# Patient Record
Sex: FEMALE | Race: BLACK OR AFRICAN AMERICAN | Employment: OTHER | URBAN - METROPOLITAN AREA
[De-identification: names, ages, dates, MRNs, and addresses within clinical notes are randomized per-mention and may not be internally consistent; named-entity substitution may affect disease eponyms.]

---

## 2024-10-22 ENCOUNTER — APPOINTMENT (OUTPATIENT)
Dept: PHYSICAL THERAPY | Age: 44
End: 2024-10-22
Payer: MEDICAID

## 2024-10-29 ENCOUNTER — HOSPITAL ENCOUNTER (OUTPATIENT)
Dept: PHYSICAL THERAPY | Age: 44
Setting detail: RECURRING SERIES
Discharge: HOME | End: 2024-11-01
Payer: MEDICAID

## 2024-10-29 DIAGNOSIS — I89.0 LYMPHEDEMA, NOT ELSEWHERE CLASSIFIED: Primary | ICD-10-CM

## 2024-10-29 DIAGNOSIS — R26.2 DIFFICULTY IN WALKING, NOT ELSEWHERE CLASSIFIED: ICD-10-CM

## 2024-10-29 DIAGNOSIS — M79.661 PAIN IN BOTH LOWER LEGS: ICD-10-CM

## 2024-10-29 DIAGNOSIS — M79.662 PAIN IN BOTH LOWER LEGS: ICD-10-CM

## 2024-10-29 PROCEDURE — 97166 OT EVAL MOD COMPLEX 45 MIN: CPT

## 2024-10-29 ASSESSMENT — PAIN SCALES - GENERAL: PAINLEVEL_OUTOF10: 9

## 2024-10-29 NOTE — THERAPY EVALUATION
Sammie Kendrick  : 1980  [place]  Secondary:  [facility] @ [address] [place] [address]  Phone: [phone]  Fax: [phone] Plan Frequency: 2x/week    Certification Period Expiration Date: 25        Plan of Care/Certification Expiration Date:  Certification Period Expiration Date: 25      Frequency/Duration: Plan Frequency: 2x/week      Time In/Out:   Time In: 1015  Time Out: 1055    OT Visit Info:  Plan Frequency: 2x/week       Visit Count: Visit count could not be calculated. Make sure you are using a visit which is associated with an episode.   OUTPATIENT OCCUPATIONAL THERAPY:Initial Assessment 10/29/2024  Episode: (lymphedema)           Treatment Diagnosis:    Lymphedema, not elsewhere classified  Difficulty in walking, not elsewhere classified  Pain in both lower legs  Medical/Referring Diagnosis:    Lymphedema, not elsewhere classified   Referring Physician:  Arnel Scott Jr., MD MD Orders:  OT Eval and Treat   Return MD Appt:  TBD  Date of Onset:    lifelong with symptoms worsening after blood clot in   Allergies:  Patient has no allergy information on record.  Restrictions/Precautions:    None      Medications Last Reviewed:  10/29/2024     SUBJECTIVE   History of Injury/Illness (Reason for Referral):  Patient referred to OT for evaluation and treatment of lymphedema. Swelling affects ability to stand, walk, transfer, perform ADLs. Skilled OT recommended for complete decongestive therapy to reduce swelling before transitioning to garments and home program.    Patient Stated Goal(s):  \"stand and walk for longer periods of time\"  Initial Pain Assessment:     9/10    Post Session Pain Level:     10  Past Medical History/Comorbidities:   Ms. Kendrick  has no past medical history on file.  Ms. Kendrick  has no past surgical history on file. Per chart: HTN, anxiety, lymphedema, morbid obesity, history

## 2024-10-29 NOTE — PROGRESS NOTES
Sammie Kendrick  : 1980  [place]  Secondary:  [facility] [place] [facility]  [address] [facility] [place] [address]  Phone: [phone]  Fax: [phone]    Plan of Care/Certification Expiration Date:  Certification Period Expiration Date: 25      Frequency/Duration:   Plan Frequency: 2x/week      Time In/Out:   Time In: 1015  Time Out: 1055    OT Visit Info:  Plan Frequency: 2x/week       Visit Count: Visit count could not be calculated. Make sure you are using a visit which is associated with an episode.   OUTPATIENT OCCUPATIONAL THERAPY: Treatment Note 10/29/2024  Episode: (lymphedema)         Treatment Diagnosis:    Lymphedema, not elsewhere classified  Difficulty in walking, not elsewhere classified  Pain in both lower legs  Medical/Referring Diagnosis:   Lymphedema, not elsewhere classified   Referring Physician:  Arnel Scott Jr., MD MD Orders:  OT Eval and Treat   Return MD Appt:  TBD   Date of Onset:     lifelong with symptoms worsening after blood clot in    Allergies:  Patient has no allergy information on record.  Restrictions/Precautions:   None      Medications Last Reviewed:  10/29/2024     Interventions Planned (Treatment may consist of any combination of the following):     Manual therapy: manual lymph drainage, Self care/ADL: multi layer compression bandaging, compression garment fitting, skin care, home program development, patient/caregiver training/education, ADL/home management training, Therapeutic exercises: ROM, strengthening     Subjective Comments:   \"I want to get rid of this swelling so I can be more active\"   >Initial Pain Level:     9/10  >Post Session Pain Level:     9/10  Medications Last Reviewed:  10/29/2024  Updated Objective Findings:  See Evaluation Note from today  Treatment   THERAPEUTIC EXERCISE: ( minutes):      MANUAL THERAPY: ( minutes):   Manual lymphatic drainage was utilized and necessary

## 2024-12-03 ENCOUNTER — HOSPITAL ENCOUNTER (OUTPATIENT)
Dept: PHYSICAL THERAPY | Age: 44
Setting detail: RECURRING SERIES
Discharge: HOME | End: 2024-12-06
Payer: MEDICAID

## 2024-12-03 PROCEDURE — 97140 MANUAL THERAPY 1/> REGIONS: CPT

## 2024-12-03 PROCEDURE — 97535 SELF CARE MNGMENT TRAINING: CPT

## 2024-12-03 ASSESSMENT — PAIN SCALES - GENERAL: PAINLEVEL_OUTOF10: 9

## 2024-12-03 NOTE — PROGRESS NOTES
Sammie Kendrick  : 1980  [place]  Secondary:  [facility] @ [facility]  [address] [facility] [place] [address]  Phone: [phone]  Fax: [phone]    Plan of Care/Certification Expiration Date:  Certification Period Expiration Date: 25      Frequency/Duration:   Plan Frequency: 2x/week      Time In/Out:   Time In: 0900  Time Out: 1000    OT Visit Info:  Plan Frequency: 2x/week       Visit Count: Visit count could not be calculated. Make sure you are using a visit which is associated with an episode.   OUTPATIENT OCCUPATIONAL THERAPY: Treatment Note 12/3/2024  Episode: (lymphedema)         Treatment Diagnosis:    No data found  Medical/Referring Diagnosis:      Referring Physician:  Arnel Scott Jr., MD MD Orders:  OT Eval and Treat   Return MD Appt:  TBD   Date of Onset:     lifelong with symptoms worsening after blood clot in    Allergies:  Patient has no allergy information on record.  Restrictions/Precautions:   None      Medications Last Reviewed:  12/3/2024     Interventions Planned (Treatment may consist of any combination of the following):     Manual therapy: manual lymph drainage, Self care/ADL: multi layer compression bandaging, compression garment fitting, skin care, home program development, patient/caregiver training/education, ADL/home management training, Therapeutic exercises: ROM, strengthening     Subjective Comments:   No new complaints  >Initial Pain Level:     9/10  >Post Session Pain Level:     8/10  Medications Last Reviewed:  12/3/2024  Updated Objective Findings:  None Today  Treatment   THERAPEUTIC EXERCISE: ( minutes):      MANUAL THERAPY: ( 35minutes):   Manual lymphatic drainage was utilized and necessary because of the patient's  lymphedema .   Manual Lymph Drainage:   Lymph Nodes:    Cervical Supraclavicular Axillary Abdominal Inguinal Popliteal Antecubital   RIGHT []     []     []     []     [x]      Detail Level: Detailed Detail Level: Zone

## 2024-12-05 ENCOUNTER — HOSPITAL ENCOUNTER (OUTPATIENT)
Dept: PHYSICAL THERAPY | Age: 44
Setting detail: RECURRING SERIES
Discharge: HOME | End: 2024-12-08
Payer: MEDICAID

## 2024-12-05 PROCEDURE — 97140 MANUAL THERAPY 1/> REGIONS: CPT

## 2024-12-05 PROCEDURE — 97535 SELF CARE MNGMENT TRAINING: CPT

## 2024-12-05 NOTE — PROGRESS NOTES
Sammie Kendrick  : 1980  [place]  Secondary:  [facility] @ [facility]  [address] [facility] [place] [address]  Phone: [phone]  Fax: [phone]    Plan of Care/Certification Expiration Date:  Certification Period Expiration Date: 25      Frequency/Duration:   Plan Frequency: 2x/week      Time In/Out:   Time In: 0900  Time Out: 1000    OT Visit Info:  Plan Frequency: 2x/week       Visit Count: Visit count could not be calculated. Make sure you are using a visit which is associated with an episode.   OUTPATIENT OCCUPATIONAL THERAPY: Treatment Note 2024  Episode: (lymphedema)         Treatment Diagnosis:    No data found  Medical/Referring Diagnosis:      Referring Physician:  Arnel Scott Jr., MD MD Orders:  OT Eval and Treat   Return MD Appt:  TBD   Date of Onset:     lifelong with symptoms worsening after blood clot in    Allergies:  Patient has no allergy information on record.  Restrictions/Precautions:   None      Medications Last Reviewed:  2024     Interventions Planned (Treatment may consist of any combination of the following):     Manual therapy: manual lymph drainage, Self care/ADL: multi layer compression bandaging, compression garment fitting, skin care, home program development, patient/caregiver training/education, ADL/home management training, Therapeutic exercises: ROM, strengthening     Subjective Comments:   No new complaints  >Initial Pain Level:      /10  >Post Session Pain Level:      /10  Medications Last Reviewed:  2024  Updated Objective Findings:  None Today  Treatment   THERAPEUTIC EXERCISE: ( minutes):      MANUAL THERAPY: ( 35minutes):   Manual lymphatic drainage was utilized and necessary because of the patient's  lymphedema .   Manual Lymph Drainage:   Lymph Nodes:    Cervical Supraclavicular Axillary Abdominal Inguinal Popliteal Antecubital   RIGHT []     []     []     []     [x]

## 2024-12-09 ENCOUNTER — APPOINTMENT (OUTPATIENT)
Dept: PHYSICAL THERAPY | Age: 44
End: 2024-12-09
Payer: MEDICAID

## 2024-12-11 ENCOUNTER — HOSPITAL ENCOUNTER (OUTPATIENT)
Dept: PHYSICAL THERAPY | Age: 44
Setting detail: RECURRING SERIES
Discharge: HOME | End: 2024-12-14
Payer: MEDICAID

## 2024-12-11 PROCEDURE — 97535 SELF CARE MNGMENT TRAINING: CPT

## 2024-12-11 PROCEDURE — 97140 MANUAL THERAPY 1/> REGIONS: CPT

## 2024-12-11 NOTE — PROGRESS NOTES
Sammie Kendrick  : 1980  [place]  Secondary:  [facility] @ [facility]  [address] [facility] [place] [address]  Phone: [phone]  Fax: [phone]    Plan of Care/Certification Expiration Date:  Certification Period Expiration Date: 25      Frequency/Duration:   Plan Frequency: 2x/week      Time In/Out:        OT Visit Info:  Plan Frequency: 2x/week       Visit Count: Visit count could not be calculated. Make sure you are using a visit which is associated with an episode.   OUTPATIENT OCCUPATIONAL THERAPY: Treatment Note 2024  Episode: (lymphedema)         Treatment Diagnosis:    No data found  Medical/Referring Diagnosis:   Lymphedema, not elsewhere classified   Referring Physician:  Arnel Scott Jr., MD MD Orders:  OT Eval and Treat   Return MD Appt:  TBD   Date of Onset:     lifelong with symptoms worsening after blood clot in    Allergies:  Patient has no allergy information on record.  Restrictions/Precautions:   None      Medications Last Reviewed:  2024     Interventions Planned (Treatment may consist of any combination of the following):     Manual therapy: manual lymph drainage, Self care/ADL: multi layer compression bandaging, compression garment fitting, skin care, home program development, patient/caregiver training/education, ADL/home management training, Therapeutic exercises: ROM, strengthening     Subjective Comments:   No new complaints  >Initial Pain Level:      /10  >Post Session Pain Level:      /10  Medications Last Reviewed:  2024  Updated Objective Findings:  None Today  Treatment   THERAPEUTIC EXERCISE: ( minutes):      MANUAL THERAPY: ( 35minutes):   Manual lymphatic drainage was utilized and necessary because of the patient's  lymphedema .   Manual Lymph Drainage:   Lymph Nodes:    Cervical Supraclavicular Axillary Abdominal Inguinal Popliteal Antecubital   RIGHT []     []     []     []

## 2024-12-16 ENCOUNTER — HOSPITAL ENCOUNTER (OUTPATIENT)
Dept: PHYSICAL THERAPY | Age: 44
Setting detail: RECURRING SERIES
Discharge: HOME | End: 2024-12-19
Payer: MEDICAID

## 2024-12-16 PROCEDURE — 97535 SELF CARE MNGMENT TRAINING: CPT

## 2024-12-16 PROCEDURE — 97140 MANUAL THERAPY 1/> REGIONS: CPT

## 2024-12-16 ASSESSMENT — PAIN SCALES - GENERAL: PAINLEVEL_OUTOF10: 6

## 2024-12-16 NOTE — PROGRESS NOTES
Sammie Kendrick  : 1980  [place]  Secondary:  [facility] @ [address] [facility] [place] [address]  Phone: [phone]  Fax: [phone]    Plan of Care/Certification Expiration Date:  Certification Period Expiration Date: 25      Frequency/Duration:   Plan Frequency: 2x/week      Time In/Out:   Time In: 1230  Time Out: 1330    OT Visit Info:  Plan Frequency: 2x/week        OUTPATIENT OCCUPATIONAL THERAPY: Treatment Note 2024  Episode: (lymphedema)         Treatment Diagnosis:    No data found  Medical/Referring Diagnosis:   Lymphedema, not elsewhere classified   Referring Physician:  Arnel Scott Jr., MD MD Orders:  OT Eval and Treat   Return MD Appt:  TBD   Date of Onset:     lifelong with symptoms worsening after blood clot in    Allergies:  Patient has no allergy information on record.  Restrictions/Precautions:   None      Medications Last Reviewed:  2024     Interventions Planned (Treatment may consist of any combination of the following):     Manual therapy: manual lymph drainage, Self care/ADL: multi layer compression bandaging, compression garment fitting, skin care, home program development, patient/caregiver training/education, ADL/home management training, Therapeutic exercises: ROM, strengthening     Subjective Comments:   \"The bandages feel good on\"  >Initial Pain Level:     6/10  >Post Session Pain Level:     6/10  Medications Last Reviewed:  2024  Updated Objective Findings:  None Today  Treatment   THERAPEUTIC EXERCISE: ( minutes):      MANUAL THERAPY: ( 40minutes):   Manual lymphatic drainage was utilized and necessary because of the patient's  lymphedema .   Manual Lymph Drainage:   Lymph Nodes:    Cervical Supraclavicular Axillary Abdominal Inguinal Popliteal Antecubital   RIGHT []     [x]     []     []     [x]     [x]     []       LEFT []     [x]     []     []     [x]     [x]     []

## 2024-12-18 ENCOUNTER — APPOINTMENT (OUTPATIENT)
Dept: PHYSICAL THERAPY | Age: 44
End: 2024-12-18
Payer: MEDICAID

## 2024-12-26 ENCOUNTER — HOSPITAL ENCOUNTER (OUTPATIENT)
Dept: PHYSICAL THERAPY | Age: 44
Setting detail: RECURRING SERIES
Discharge: HOME | End: 2024-12-29
Payer: MEDICAID

## 2024-12-26 DIAGNOSIS — I89.0 LYMPHEDEMA: Primary | ICD-10-CM

## 2024-12-26 PROCEDURE — 97535 SELF CARE MNGMENT TRAINING: CPT

## 2024-12-26 PROCEDURE — 97140 MANUAL THERAPY 1/> REGIONS: CPT

## 2024-12-26 NOTE — PROGRESS NOTES
Sammie Kendrick  : 1980  [place]  Secondary:  [facility] @ [place] [address] [address] [facility] [place] [address]  Phone: [phone]  Fax: [phone]    Plan of Care/Certification Expiration Date:  Certification Period Expiration Date: 25      Frequency/Duration:   Plan Frequency: 2x/week      Time In/Out:   Time In: 1430  Time Out: 1530    OT Visit Info:  Plan Frequency: 2x/week        OUTPATIENT OCCUPATIONAL THERAPY: Treatment Note 2024  Episode: (lymphedema)         Treatment Diagnosis:    Lymphedema  Medical/Referring Diagnosis:   Lymphedema, not elsewhere classified   Referring Physician:  Arnel Scott Jr., MD MD Orders:  OT Eval and Treat   Return MD Appt:  TBD   Date of Onset:     lifelong with symptoms worsening after blood clot in    Allergies:  Patient has no allergy information on record.  Restrictions/Precautions:   None      Medications Last Reviewed:  2024     Interventions Planned (Treatment may consist of any combination of the following):     Manual therapy: manual lymph drainage, Self care/ADL: multi layer compression bandaging, compression garment fitting, skin care, home program development, patient/caregiver training/education, ADL/home management training, Therapeutic exercises: ROM, strengthening     Subjective Comments:   \"The bandages feel good on\"  >Initial Pain Level:      /10  >Post Session Pain Level:      /10  Medications Last Reviewed:  2024  Updated Objective Findings:  None Today  Treatment   THERAPEUTIC EXERCISE: ( minutes):      MANUAL THERAPY: ( 40minutes):   Manual lymphatic drainage was utilized and necessary because of the patient's  lymphedema .   Manual Lymph Drainage:   Lymph Nodes:    Cervical Supraclavicular Axillary Abdominal Inguinal Popliteal Antecubital   RIGHT []     [x]     []     []     [x]     [x]     []       LEFT []     [x]     []     []     [x]     [x]     []

## 2024-12-30 ENCOUNTER — HOSPITAL ENCOUNTER (OUTPATIENT)
Dept: PHYSICAL THERAPY | Age: 44
Setting detail: RECURRING SERIES
Discharge: HOME | End: 2025-01-02
Payer: MEDICAID

## 2024-12-30 PROCEDURE — 97535 SELF CARE MNGMENT TRAINING: CPT

## 2024-12-30 PROCEDURE — 97140 MANUAL THERAPY 1/> REGIONS: CPT

## 2024-12-30 ASSESSMENT — PAIN SCALES - GENERAL: PAINLEVEL_OUTOF10: 6

## 2024-12-30 NOTE — PROGRESS NOTES
LEFT []     [x]     []     []     [x]     [x]     []           Limbs:   []    RUE     []    LUE     [x]    RLE    [x]    LLE   SELF CARE: (40 minutes):   After skin care with Eucerin lotion, a multi layered compression bandage was applied bilaterally from foot to knee using cotton stockinet, rosidal foam and 3 short stretch bandages per leg. She was instructed on exercises, elevation and to remove bandages if any medical complications.   Measured for Compriflex Sigvaris Transition Calf pieces:  R size large; L size XL Regular length black  Recommend patient purchase Bioflect leggings  Measured cumulative circumferential volumetric graph measurements today.      PRETREATMENT AFFECTED LIMB(s): RIGHT LOWER EXTREMITY  LEFT LOWER EXTREMITY       Date:   10-29-24 12.30          Right / Left              Groin    []      []              8 inches    []      [] 86/89  74/85          4 inches    []      [] 72/80.5  62/63       PoplitealSpace    []      [] 50.5/52  48/49          8 inches    []      [] 47.5/  52.5  47/51          4 inches    []      [] 38/39  38/41          Ankle    []      [] 32.5/33  29/31          Instep    []      [] 25/26.5  26/26       Measurements are taken in centimeters:  2.54 cm = 1 inch   Cumulative circumferential volumetric graph measurements  RLE total size 351.5  cm         LLE total size 372.5  cm                 Treatment/Session Summary:    Treatment Assessment:   Patient is making progress. Recommend continuation of therapy  Authorization sent on 12/27/2024 for more visits.   Communication/Consultation:  Prism order placed  Equipment provided today:  None  Recommendations/Intent for next treatment session: Next visit will focus on complete decongestive therapy to reduce lymphedema (phase I) before transitioning maintenance/home program (phase II).    >Total Treatment Billable Duration:   OT Individual Minutes  Time In: 1335  Time Out: 1435  Minutes: 60    Ariana Horton OTR/ASUNCION,

## 2025-01-06 ENCOUNTER — HOSPITAL ENCOUNTER (OUTPATIENT)
Dept: PHYSICAL THERAPY | Age: 45
Setting detail: RECURRING SERIES
End: 2025-01-06
Payer: MEDICAID

## 2025-01-08 ENCOUNTER — APPOINTMENT (OUTPATIENT)
Dept: PHYSICAL THERAPY | Age: 45
End: 2025-01-08
Payer: MEDICAID

## 2025-01-15 ENCOUNTER — HOSPITAL ENCOUNTER (OUTPATIENT)
Dept: PHYSICAL THERAPY | Age: 45
Setting detail: RECURRING SERIES
Discharge: HOME | End: 2025-01-18
Payer: MEDICAID

## 2025-01-15 PROCEDURE — 97535 SELF CARE MNGMENT TRAINING: CPT

## 2025-01-15 PROCEDURE — 97140 MANUAL THERAPY 1/> REGIONS: CPT

## 2025-01-15 NOTE — PROGRESS NOTES
Sammie Kendrick  : 1980  [place]  Secondary:  [facility] @ [facility]  [address] [facility] [place] [address]  Phone: [phone]  Fax: [phone]    Plan of Care/Certification Expiration Date:  Certification Period Expiration Date: 25      Frequency/Duration:   Plan Frequency: 2x/week      Time In/Out:        OT Visit Info:  Plan Frequency: 2x/week  Total # of Visits to Date: 7  Progress Note Counter: 7        OUTPATIENT OCCUPATIONAL THERAPY: Treatment Note 1/15/2025  Episode: (lymphedema)         Treatment Diagnosis:    No data found  Medical/Referring Diagnosis:   Lymphedema, not elsewhere classified   Referring Physician:  Arnel Scott Jr., MD MD Orders:  OT Eval and Treat   Return MD Appt:  TBD   Date of Onset:     lifelong with symptoms worsening after blood clot in    Allergies:  Patient has no allergy information on record.  Restrictions/Precautions:   None      Medications Last Reviewed:  1/15/2025     Interventions Planned (Treatment may consist of any combination of the following):     Manual therapy: manual lymph drainage, Self care/ADL: multi layer compression bandaging, compression garment fitting, skin care, home program development, patient/caregiver training/education, ADL/home management training, Therapeutic exercises: ROM, strengthening     Subjective Comments:   \"The bandages feel good on\"  >Initial Pain Level:      /10  >Post Session Pain Level:      /10  Medications Last Reviewed:  1/15/2025  Updated Objective Findings:  None Today  Treatment   THERAPEUTIC EXERCISE: ( minutes):      MANUAL THERAPY: ( 20 minutes):   Manual lymphatic drainage was utilized and necessary because of the patient's  lymphedema .   Manual Lymph Drainage:   Lymph Nodes:    Cervical Supraclavicular Axillary Abdominal Inguinal Popliteal Antecubital   RIGHT []     [x]     []     []     [x]     [x]     []       LEFT []     [x]     []

## 2025-01-21 ENCOUNTER — HOSPITAL ENCOUNTER (OUTPATIENT)
Dept: PHYSICAL THERAPY | Age: 45
Setting detail: RECURRING SERIES
Discharge: HOME | End: 2025-01-24
Payer: MEDICAID

## 2025-01-21 PROCEDURE — 97140 MANUAL THERAPY 1/> REGIONS: CPT

## 2025-01-21 PROCEDURE — 97535 SELF CARE MNGMENT TRAINING: CPT

## 2025-01-21 ASSESSMENT — PAIN SCALES - GENERAL: PAINLEVEL_OUTOF10: 6

## 2025-01-21 NOTE — PROGRESS NOTES
Sammie Kendrick  : 1980  [place]  Secondary:  [facility] @ [facility]  [address] [place] [address]  Phone: [phone]  Fax: [phone]    Plan of Care/Certification Expiration Date:  Certification Period Expiration Date: 25      Frequency/Duration:   Plan Frequency: 2x/week      Time In/Out:   Time In: 1330  Time Out: 1425    OT Visit Info:  Plan Frequency: 2x/week  Total # of Visits to Date: 8  Progress Note Counter: 8        OUTPATIENT OCCUPATIONAL THERAPY: Treatment Note 2025  Episode: (lymphedema)         Treatment Diagnosis:    No data found  Medical/Referring Diagnosis:   Lymphedema, not elsewhere classified   Referring Physician:  Arnel Scott Jr., MD MD Orders:  OT Eval and Treat   Return MD Appt:  TBD   Date of Onset:     lifelong with symptoms worsening after blood clot in    Allergies:  Patient has no allergy information on record.  Restrictions/Precautions:   None      Medications Last Reviewed:  2025     Interventions Planned (Treatment may consist of any combination of the following):     Manual therapy: manual lymph drainage, Self care/ADL: multi layer compression bandaging, compression garment fitting, skin care, home program development, patient/caregiver training/education, ADL/home management training, Therapeutic exercises: ROM, strengthening     Subjective Comments:   \"I brought my velcro wraps.\"  >Initial Pain Level:     6/10  >Post Session Pain Level:     6/10  Medications Last Reviewed:  2025  Updated Objective Findings:  None Today  Treatment   THERAPEUTIC EXERCISE: ( minutes):      MANUAL THERAPY: ( 40 minutes):   Manual lymphatic drainage was utilized and necessary because of the patient's  lymphedema .   Manual Lymph Drainage:   Lymph Nodes:    Cervical Supraclavicular Axillary Abdominal Inguinal Popliteal Antecubital   RIGHT []     [x]     []     []     [x]     [x]     []

## 2025-01-23 ENCOUNTER — HOSPITAL ENCOUNTER (OUTPATIENT)
Dept: PHYSICAL THERAPY | Age: 45
Setting detail: RECURRING SERIES
Discharge: HOME | End: 2025-01-26
Payer: MEDICAID

## 2025-01-23 PROCEDURE — 97140 MANUAL THERAPY 1/> REGIONS: CPT

## 2025-01-23 PROCEDURE — 97535 SELF CARE MNGMENT TRAINING: CPT

## 2025-01-27 ENCOUNTER — APPOINTMENT (OUTPATIENT)
Dept: PHYSICAL THERAPY | Age: 45
End: 2025-01-27
Payer: MEDICAID

## 2025-01-29 ENCOUNTER — HOSPITAL ENCOUNTER (OUTPATIENT)
Dept: PHYSICAL THERAPY | Age: 45
Setting detail: RECURRING SERIES
Discharge: HOME | End: 2025-02-01
Payer: MEDICAID

## 2025-01-29 PROCEDURE — 97535 SELF CARE MNGMENT TRAINING: CPT

## 2025-01-29 PROCEDURE — 97140 MANUAL THERAPY 1/> REGIONS: CPT

## 2025-01-29 ASSESSMENT — PAIN SCALES - GENERAL: PAINLEVEL_OUTOF10: 6

## 2025-01-29 NOTE — THERAPY DISCHARGE
Sammie Kendrick  : 1980  [place]  Secondary:  [facility] @ [place] [address] [place] [address]  Phone: [phone]  Fax: [phone] Plan Frequency: 2x/week    Certification Period Expiration Date: 25        Plan of Care/Certification Expiration Date:  Certification Period Expiration Date: 25      Frequency/Duration: Plan Frequency: 2x/week      Time In/Out:   Time In: 1010  Time Out: 1055    OT Visit Info:  Plan Frequency: 2x/week  Total # of Visits to Date: 11  Progress Note Counter: 11       Visit Count: Visit count could not be calculated. Make sure you are using a visit which is associated with an episode.   OUTPATIENT OCCUPATIONAL THERAPY:Discharge Summary 2025  Episode: (lymphedema)           Treatment Diagnosis:    No data found  Medical/Referring Diagnosis:    Lymphedema, not elsewhere classified   Referring Physician:  Arnel Scott Jr., MD MD Orders:  OT Eval and Treat   Return MD Appt:  TBD  Date of Onset:    lifelong with symptoms worsening after blood clot in   Allergies:  Patient has no allergy information on record.  Restrictions/Precautions:    None      Medications Last Reviewed:  2025       OBJECTIVE   Edema/Girth      PRETREATMENT AFFECTED LIMB(s): RIGHT LOWER EXTREMITY  LEFT LOWER EXTREMITY       Date:   10-29-24 12.30          Right / Left              Groin    []      []              8 inches    []      [] 86/89  74/85          4 inches    []      [] 72/80.5  62/63       PoplitealSpace    []      [] 50.5/52  48/49          8 inches    []      [] 47.5/  52.5  47/51          4 inches    []      [] 38/39  38/41          Ankle    []      [] 32.5/33  29/31          Instep    []      [] 25/26.5  26/26       Measurements are taken in centimeters:  2.54 cm = 1 inch   Cumulative circumferential volumetric graph measurements  RLE total size 351.5  cm  298 cm       LLE total size 372.5  cm

## 2025-01-29 NOTE — PROGRESS NOTES
Sammie Kendrick  : 1980  [place]  Secondary:  [facility] @ [employer]  [address] [place] [address]  Phone: [phone]  Fax: [phone]    Plan of Care/Certification Expiration Date:  Certification Period Expiration Date: 25      Frequency/Duration:   Plan Frequency: 2x/week      Time In/Out:   Time In: 1010  Time Out: 1055    OT Visit Info:  Plan Frequency: 2x/week  Total # of Visits to Date: 11  Progress Note Counter: 11        OUTPATIENT OCCUPATIONAL THERAPY: Treatment Note 2025  Episode: (lymphedema)         Treatment Diagnosis:    No data found  Medical/Referring Diagnosis:   Lymphedema, not elsewhere classified   Referring Physician:  Arnel Scott Jr., MD MD Orders:  OT Eval and Treat   Return MD Appt:  TBD   Date of Onset:     lifelong with symptoms worsening after blood clot in    Allergies:  Patient has no allergy information on record.  Restrictions/Precautions:   None      Medications Last Reviewed:  2025     Interventions Planned (Treatment may consist of any combination of the following):     Manual therapy: manual lymph drainage, Self care/ADL: multi layer compression bandaging, compression garment fitting, skin care, home program development, patient/caregiver training/education, ADL/home management training, Therapeutic exercises: ROM, strengthening     Subjective Comments:   \"I brought my velcro wraps.\"  >Initial Pain Level:     6/10  >Post Session Pain Level:     6/10  Medications Last Reviewed:  2025  Updated Objective Findings:  None Today  Treatment   THERAPEUTIC EXERCISE: ( minutes):      MANUAL THERAPY: ( 35 minutes):   Manual lymphatic drainage was utilized and necessary because of the patient's  lymphedema .   Manual Lymph Drainage:   Lymph Nodes:    Cervical Supraclavicular Axillary Abdominal Inguinal Popliteal Antecubital   RIGHT []     [x]     []     []     [x]     [x]     []